# Patient Record
Sex: MALE | Race: WHITE
[De-identification: names, ages, dates, MRNs, and addresses within clinical notes are randomized per-mention and may not be internally consistent; named-entity substitution may affect disease eponyms.]

---

## 2022-10-06 ENCOUNTER — NURSE TRIAGE (OUTPATIENT)
Dept: OTHER | Facility: CLINIC | Age: 37
End: 2022-10-06

## 2022-10-06 NOTE — TELEPHONE ENCOUNTER
Received call from Krish at [facility] with [employer] Complaint; Patient with Red Flag Complaint requesting to establish care with [facility] Dorinda TODD. .    Subjective: Caller states \"My chest had been having pain in my sternum. I have been lifting weights, but I checked my blood pressure. I feel weird. \"     Current Symptoms: chest pain- center of chest. Constantly coming and going. But under 5 minutes. Does not worsen with exertion. Has awakened with arm tingling- sleeps with arm over head. Has worsened the past couple of nights    Light headed when running    Elevated B/P at [facility] 195/115 yesterday,  1 hour ago-  162/105    Onset: 6 days ago; unchanged    Associated Symptoms: NA    Pain Severity: 2/10; sometimes shooting, sometimes pulsations ; intermittent    Temperature: none     What has been tried: nothing    LMP: NA Pregnant: NA    Recommended disposition: See in Office Today    [address] if any no appts. Highly encouraged patient to go to [address] to get an ECG and that young people can have heart attacks and  strokes. Care advice provided, patient verbalizes understanding; denies any other questions or concerns; instructed to call back for any new or worsening symptoms. Patient/Caller agrees with recommended disposition; writer provided warm transfer to LORIE Shukla at [facility] for appointment scheduling    Attention Provider: Thank you for allowing me to participate in the care of your patient. The patient was connected to triage in response to information provided to the [facility]. Please do not respond through this encounter as the response is not directed to a shared pool.       Reason for Disposition   Chest pain(s) lasting a few seconds persists > 3 days    Protocols used: Chest Pain-ADULT-[place]